# Patient Record
Sex: MALE | Race: WHITE | Employment: OTHER | URBAN - METROPOLITAN AREA
[De-identification: names, ages, dates, MRNs, and addresses within clinical notes are randomized per-mention and may not be internally consistent; named-entity substitution may affect disease eponyms.]

---

## 2018-09-02 ENCOUNTER — HOSPITAL ENCOUNTER (EMERGENCY)
Age: 63
Discharge: HOME | End: 2018-09-02
Payer: MEDICARE

## 2018-09-02 VITALS
HEART RATE: 91 BPM | RESPIRATION RATE: 18 BRPM | TEMPERATURE: 98.9 F | DIASTOLIC BLOOD PRESSURE: 97 MMHG | BODY MASS INDEX: 33.23 KG/M2 | OXYGEN SATURATION: 96 % | SYSTOLIC BLOOD PRESSURE: 177 MMHG | WEIGHT: 225 LBS

## 2018-09-02 DIAGNOSIS — N39.0 URINARY TRACT INFECTION WITHOUT HEMATURIA, SITE UNSPECIFIED: Primary | ICD-10-CM

## 2018-09-02 LAB
BACTERIA: NORMAL /HPF
BILIRUBIN URINE: NEGATIVE
BLOOD, URINE: NEGATIVE
CLARITY: CLEAR
COLOR: YELLOW
GLUCOSE URINE: NEGATIVE MG/DL
KETONES, URINE: NEGATIVE MG/DL
LEUKOCYTE ESTERASE, URINE: ABNORMAL
NITRITE, URINE: NEGATIVE
PH UA: 5.5 (ref 5–9)
PROTEIN UA: NEGATIVE MG/DL
RBC UA: NORMAL /HPF (ref 0–2)
SPECIFIC GRAVITY UA: 1.02 (ref 1–1.03)
UROBILINOGEN, URINE: 0.2 E.U./DL
WBC UA: NORMAL /HPF (ref 0–5)

## 2018-09-02 PROCEDURE — 87088 URINE BACTERIA CULTURE: CPT

## 2018-09-02 PROCEDURE — 99212 OFFICE O/P EST SF 10 MIN: CPT

## 2018-09-02 PROCEDURE — 81001 URINALYSIS AUTO W/SCOPE: CPT

## 2018-09-02 RX ORDER — CEPHALEXIN 500 MG/1
500 CAPSULE ORAL 3 TIMES DAILY
Qty: 21 CAPSULE | Refills: 0 | Status: SHIPPED | OUTPATIENT
Start: 2018-09-02 | End: 2018-09-09

## 2018-09-02 ASSESSMENT — PAIN SCALES - GENERAL: PAINLEVEL_OUTOF10: 7

## 2018-09-02 ASSESSMENT — PAIN DESCRIPTION - DESCRIPTORS: DESCRIPTORS: BURNING

## 2018-09-02 ASSESSMENT — PAIN DESCRIPTION - LOCATION: LOCATION: PENIS

## 2018-09-02 NOTE — ED PROVIDER NOTES
Negative Negative    Leukocyte Esterase, Urine TRACE (A) Negative   Microscopic Urinalysis   Result Value Ref Range    WBC, UA 1-3 0 - 5 /HPF    RBC, UA 0-1 0 - 2 /HPF    Bacteria, UA NONE /HPF       RADIOLOGY:  Interpreted by Radiologist.  No orders to display       ------------------------- NURSING NOTES AND VITALS REVIEWED ---------------------------   The nursing notes within the ED encounter and vital signs as below have been reviewed. BP (!) 177/97   Pulse 91   Temp 98.9 °F (37.2 °C) (Oral)   Resp 18   Wt 225 lb (102.1 kg)   SpO2 96%   BMI 33.23 kg/m²   Oxygen Saturation Interpretation: Normal      ---------------------------------------------------PHYSICAL EXAM--------------------------------------      Constitutional/General: Alert and oriented x3, well appearing, non toxic in NAD  Head: Normocephalic and atraumatic  Eyes: Conjunctiva clear  Mouth: Oropharynx clear, handling secretions, no trismus  Neck: Supple, full ROM,   Pulmonary: Lungs clear to auscultation bilaterally, no wheezes, rales, or rhonchi. Not in respiratory distress  Cardiovascular:  Regular rate and rhythm, no murmurs, gallops, or rubs. 2+ distal pulses  Abdomen: Soft, non tender, non distended,   Extremities: Moves all extremities x 4. Warm and well perfused  Skin: warm and dry without rash  Neurologic: GCS 15,  Psych: Normal Affect      ------------------------------ ED COURSE/MEDICAL DECISION MAKING----------------------  Medications - No data to display      ED COURSE:       Medical Decision Making:    Patient complaining of some burning with urination for 3 days. He does have some leukocyte esterase in his urine will send that for culture. I will put him on some Keflex and see if that takes care of his symptoms it doesn't resolve he needs to follow-up with his doctor or if he worsens.   He said there is no chance it's an STD since he's been  for 30 years and he's not suspecting that all denies any drainage or

## 2018-09-05 LAB — URINE CULTURE, ROUTINE: NORMAL

## 2018-10-05 ENCOUNTER — HOSPITAL ENCOUNTER (EMERGENCY)
Age: 63
Discharge: HOME | End: 2018-10-05
Payer: MEDICARE

## 2018-10-05 VITALS
DIASTOLIC BLOOD PRESSURE: 95 MMHG | SYSTOLIC BLOOD PRESSURE: 160 MMHG | WEIGHT: 225 LBS | RESPIRATION RATE: 18 BRPM | TEMPERATURE: 98.2 F | OXYGEN SATURATION: 97 % | HEART RATE: 87 BPM | BODY MASS INDEX: 33.23 KG/M2

## 2018-10-05 DIAGNOSIS — S01.81XA FACIAL LACERATION, INITIAL ENCOUNTER: ICD-10-CM

## 2018-10-05 DIAGNOSIS — R30.0 DYSURIA: Primary | ICD-10-CM

## 2018-10-05 LAB
BILIRUBIN URINE: NEGATIVE
BLOOD, URINE: NEGATIVE
CLARITY: CLEAR
COLOR: YELLOW
GLUCOSE URINE: NEGATIVE MG/DL
KETONES, URINE: NEGATIVE MG/DL
LEUKOCYTE ESTERASE, URINE: NEGATIVE
NITRITE, URINE: NEGATIVE
PH UA: 5.5 (ref 5–9)
PROTEIN UA: NEGATIVE MG/DL
SPECIFIC GRAVITY UA: <=1.005 (ref 1–1.03)
UROBILINOGEN, URINE: 0.2 E.U./DL

## 2018-10-05 PROCEDURE — 87591 N.GONORRHOEAE DNA AMP PROB: CPT

## 2018-10-05 PROCEDURE — 99212 OFFICE O/P EST SF 10 MIN: CPT

## 2018-10-05 PROCEDURE — 87088 URINE BACTERIA CULTURE: CPT

## 2018-10-05 PROCEDURE — 87491 CHLMYD TRACH DNA AMP PROBE: CPT

## 2018-10-05 PROCEDURE — 81003 URINALYSIS AUTO W/O SCOPE: CPT

## 2018-10-05 RX ORDER — NITROFURANTOIN 25; 75 MG/1; MG/1
100 CAPSULE ORAL 2 TIMES DAILY
Qty: 14 CAPSULE | Refills: 0 | Status: SHIPPED | OUTPATIENT
Start: 2018-10-05 | End: 2018-10-12

## 2018-10-05 ASSESSMENT — PAIN DESCRIPTION - ORIENTATION: ORIENTATION: LEFT

## 2018-10-05 ASSESSMENT — PAIN SCALES - GENERAL: PAINLEVEL_OUTOF10: 3

## 2018-10-05 ASSESSMENT — PAIN DESCRIPTION - PAIN TYPE: TYPE: ACUTE PAIN

## 2018-10-05 ASSESSMENT — PAIN DESCRIPTION - LOCATION: LOCATION: EYE

## 2018-10-05 ASSESSMENT — PAIN DESCRIPTION - DESCRIPTORS: DESCRIPTORS: BURNING;THROBBING

## 2018-10-08 LAB
CHLAMYDIA TRACHOMATIS AMPLIFIED DET: NORMAL
N GONORRHOEAE AMPLIFIED DET: NORMAL
URINE CULTURE, ROUTINE: NORMAL

## 2019-02-28 ENCOUNTER — OFFICE VISIT (OUTPATIENT)
Dept: SURGERY | Age: 64
End: 2019-02-28
Payer: MEDICARE

## 2019-02-28 VITALS
OXYGEN SATURATION: 98 % | BODY MASS INDEX: 33.33 KG/M2 | WEIGHT: 225 LBS | HEART RATE: 81 BPM | SYSTOLIC BLOOD PRESSURE: 136 MMHG | DIASTOLIC BLOOD PRESSURE: 84 MMHG | HEIGHT: 69 IN

## 2019-02-28 DIAGNOSIS — L90.5 SCAR OF FACE: Primary | ICD-10-CM

## 2019-02-28 PROCEDURE — G8484 FLU IMMUNIZE NO ADMIN: HCPCS | Performed by: PHYSICIAN ASSISTANT

## 2019-02-28 PROCEDURE — G8427 DOCREV CUR MEDS BY ELIG CLIN: HCPCS | Performed by: PHYSICIAN ASSISTANT

## 2019-02-28 PROCEDURE — G8417 CALC BMI ABV UP PARAM F/U: HCPCS | Performed by: PHYSICIAN ASSISTANT

## 2019-02-28 PROCEDURE — 1101F PT FALLS ASSESS-DOCD LE1/YR: CPT | Performed by: PHYSICIAN ASSISTANT

## 2019-02-28 PROCEDURE — 1036F TOBACCO NON-USER: CPT | Performed by: PHYSICIAN ASSISTANT

## 2019-02-28 PROCEDURE — 1123F ACP DISCUSS/DSCN MKR DOCD: CPT | Performed by: PHYSICIAN ASSISTANT

## 2019-02-28 PROCEDURE — 4040F PNEUMOC VAC/ADMIN/RCVD: CPT | Performed by: PHYSICIAN ASSISTANT

## 2019-02-28 PROCEDURE — 99203 OFFICE O/P NEW LOW 30 MIN: CPT | Performed by: PHYSICIAN ASSISTANT

## 2019-02-28 PROCEDURE — 3017F COLORECTAL CA SCREEN DOC REV: CPT | Performed by: PHYSICIAN ASSISTANT

## 2019-03-08 ENCOUNTER — ANESTHESIA EVENT (OUTPATIENT)
Dept: OPERATING ROOM | Age: 64
End: 2019-03-08
Payer: MEDICARE

## 2019-03-08 RX ORDER — LOSARTAN POTASSIUM 50 MG/1
50 TABLET ORAL NIGHTLY
COMMUNITY

## 2019-03-08 SDOH — HEALTH STABILITY: MENTAL HEALTH: HOW OFTEN DO YOU HAVE A DRINK CONTAINING ALCOHOL?: NEVER

## 2019-03-15 ENCOUNTER — HOSPITAL ENCOUNTER (OUTPATIENT)
Age: 64
Setting detail: OUTPATIENT SURGERY
Discharge: HOME | End: 2019-03-15
Attending: PODIATRIST | Admitting: PODIATRIST
Payer: MEDICARE

## 2019-03-15 ENCOUNTER — HOSPITAL ENCOUNTER (OUTPATIENT)
Dept: OPERATING ROOM | Age: 64
Discharge: HOME | End: 2019-03-15
Payer: MEDICARE

## 2019-03-15 ENCOUNTER — ANESTHESIA (OUTPATIENT)
Dept: OPERATING ROOM | Age: 64
End: 2019-03-15
Payer: MEDICARE

## 2019-03-15 VITALS — SYSTOLIC BLOOD PRESSURE: 114 MMHG | DIASTOLIC BLOOD PRESSURE: 69 MMHG | OXYGEN SATURATION: 98 %

## 2019-03-15 VITALS
DIASTOLIC BLOOD PRESSURE: 97 MMHG | HEIGHT: 69 IN | WEIGHT: 225 LBS | TEMPERATURE: 98 F | BODY MASS INDEX: 33.33 KG/M2 | SYSTOLIC BLOOD PRESSURE: 153 MMHG | HEART RATE: 66 BPM | RESPIRATION RATE: 16 BRPM | OXYGEN SATURATION: 95 %

## 2019-03-15 DIAGNOSIS — M20.21 HALLUX RIGIDUS OF BOTH FEET: ICD-10-CM

## 2019-03-15 DIAGNOSIS — M20.22 HALLUX RIGIDUS OF BOTH FEET: ICD-10-CM

## 2019-03-15 DIAGNOSIS — R52 PAIN: ICD-10-CM

## 2019-03-15 DIAGNOSIS — M20.42 ACQUIRED HAMMER TOE OF GREAT TOES OF BOTH FEET: Primary | ICD-10-CM

## 2019-03-15 DIAGNOSIS — M20.41 ACQUIRED HAMMER TOE OF GREAT TOES OF BOTH FEET: Primary | ICD-10-CM

## 2019-03-15 PROCEDURE — 3600000004 HC SURGERY LEVEL 4 BASE: Performed by: PODIATRIST

## 2019-03-15 PROCEDURE — 6370000000 HC RX 637 (ALT 250 FOR IP): Performed by: ANESTHESIOLOGY

## 2019-03-15 PROCEDURE — 3600000014 HC SURGERY LEVEL 4 ADDTL 15MIN: Performed by: PODIATRIST

## 2019-03-15 PROCEDURE — 3700000001 HC ADD 15 MINUTES (ANESTHESIA): Performed by: PODIATRIST

## 2019-03-15 PROCEDURE — 2709999900 HC NON-CHARGEABLE SUPPLY: Performed by: PODIATRIST

## 2019-03-15 PROCEDURE — 3209999900 FLUORO FOR SURGICAL PROCEDURES

## 2019-03-15 PROCEDURE — 3700000000 HC ANESTHESIA ATTENDED CARE: Performed by: PODIATRIST

## 2019-03-15 PROCEDURE — 2580000003 HC RX 258: Performed by: ANESTHESIOLOGY

## 2019-03-15 PROCEDURE — 7100000010 HC PHASE II RECOVERY - FIRST 15 MIN: Performed by: PODIATRIST

## 2019-03-15 PROCEDURE — 7100000011 HC PHASE II RECOVERY - ADDTL 15 MIN: Performed by: PODIATRIST

## 2019-03-15 PROCEDURE — 6360000002 HC RX W HCPCS: Performed by: NURSE ANESTHETIST, CERTIFIED REGISTERED

## 2019-03-15 PROCEDURE — 2500000003 HC RX 250 WO HCPCS: Performed by: PODIATRIST

## 2019-03-15 PROCEDURE — 2580000003 HC RX 258: Performed by: PODIATRIST

## 2019-03-15 PROCEDURE — C1713 ANCHOR/SCREW BN/BN,TIS/BN: HCPCS | Performed by: PODIATRIST

## 2019-03-15 PROCEDURE — C9359 IMPLNT,BON VOID FILLER-PUTTY: HCPCS | Performed by: PODIATRIST

## 2019-03-15 PROCEDURE — 6360000002 HC RX W HCPCS: Performed by: PODIATRIST

## 2019-03-15 PROCEDURE — 2720000010 HC SURG SUPPLY STERILE: Performed by: PODIATRIST

## 2019-03-15 DEVICE — IMPLANTABLE DEVICE
Type: IMPLANTABLE DEVICE | Site: FOOT | Status: FUNCTIONAL
Brand: ORTHOLOC 3DI

## 2019-03-15 DEVICE — IMPLANTABLE DEVICE
Type: IMPLANTABLE DEVICE | Site: FOOT | Status: FUNCTIONAL
Brand: ORTHOLOC

## 2019-03-15 DEVICE — MUC SCREW
Type: IMPLANTABLE DEVICE | Site: FOOT | Status: FUNCTIONAL
Brand: CHARLOTTE

## 2019-03-15 DEVICE — INJECTABLE PUTTY
Type: IMPLANTABLE DEVICE | Site: FOOT | Status: FUNCTIONAL
Brand: ALLOMATRIX

## 2019-03-15 RX ORDER — ACETAMINOPHEN 325 MG/1
650 TABLET ORAL EVERY 4 HOURS PRN
Status: DISCONTINUED | OUTPATIENT
Start: 2019-03-15 | End: 2019-03-15 | Stop reason: HOSPADM

## 2019-03-15 RX ORDER — HYDRALAZINE HYDROCHLORIDE 20 MG/ML
5 INJECTION INTRAMUSCULAR; INTRAVENOUS EVERY 10 MIN PRN
Status: DISCONTINUED | OUTPATIENT
Start: 2019-03-15 | End: 2019-03-15 | Stop reason: HOSPADM

## 2019-03-15 RX ORDER — SODIUM CHLORIDE, SODIUM LACTATE, POTASSIUM CHLORIDE, CALCIUM CHLORIDE 600; 310; 30; 20 MG/100ML; MG/100ML; MG/100ML; MG/100ML
INJECTION, SOLUTION INTRAVENOUS CONTINUOUS
Status: DISCONTINUED | OUTPATIENT
Start: 2019-03-15 | End: 2019-03-15 | Stop reason: HOSPADM

## 2019-03-15 RX ORDER — HYDROMORPHONE HYDROCHLORIDE 1 MG/ML
0.25 INJECTION, SOLUTION INTRAMUSCULAR; INTRAVENOUS; SUBCUTANEOUS EVERY 5 MIN PRN
Status: DISCONTINUED | OUTPATIENT
Start: 2019-03-15 | End: 2019-03-15 | Stop reason: HOSPADM

## 2019-03-15 RX ORDER — MIDAZOLAM HYDROCHLORIDE 1 MG/ML
INJECTION INTRAMUSCULAR; INTRAVENOUS PRN
Status: DISCONTINUED | OUTPATIENT
Start: 2019-03-15 | End: 2019-03-15 | Stop reason: SDUPTHER

## 2019-03-15 RX ORDER — MORPHINE SULFATE 2 MG/ML
1 INJECTION, SOLUTION INTRAMUSCULAR; INTRAVENOUS EVERY 5 MIN PRN
Status: DISCONTINUED | OUTPATIENT
Start: 2019-03-15 | End: 2019-03-15 | Stop reason: HOSPADM

## 2019-03-15 RX ORDER — PROMETHAZINE HYDROCHLORIDE 25 MG/ML
25 INJECTION, SOLUTION INTRAMUSCULAR; INTRAVENOUS
Status: DISCONTINUED | OUTPATIENT
Start: 2019-03-15 | End: 2019-03-15 | Stop reason: HOSPADM

## 2019-03-15 RX ORDER — DIPHENHYDRAMINE HYDROCHLORIDE 50 MG/ML
12.5 INJECTION INTRAMUSCULAR; INTRAVENOUS
Status: DISCONTINUED | OUTPATIENT
Start: 2019-03-15 | End: 2019-03-15 | Stop reason: HOSPADM

## 2019-03-15 RX ORDER — FENTANYL CITRATE 50 UG/ML
50 INJECTION, SOLUTION INTRAMUSCULAR; INTRAVENOUS EVERY 5 MIN PRN
Status: DISCONTINUED | OUTPATIENT
Start: 2019-03-15 | End: 2019-03-15 | Stop reason: HOSPADM

## 2019-03-15 RX ORDER — FENTANYL CITRATE 50 UG/ML
INJECTION, SOLUTION INTRAMUSCULAR; INTRAVENOUS PRN
Status: DISCONTINUED | OUTPATIENT
Start: 2019-03-15 | End: 2019-03-15 | Stop reason: SDUPTHER

## 2019-03-15 RX ORDER — MEPERIDINE HYDROCHLORIDE 50 MG/ML
12.5 INJECTION INTRAMUSCULAR; INTRAVENOUS; SUBCUTANEOUS EVERY 5 MIN PRN
Status: DISCONTINUED | OUTPATIENT
Start: 2019-03-15 | End: 2019-03-15 | Stop reason: HOSPADM

## 2019-03-15 RX ORDER — HYDROCODONE BITARTRATE AND ACETAMINOPHEN 5; 325 MG/1; MG/1
2 TABLET ORAL PRN
Status: DISCONTINUED | OUTPATIENT
Start: 2019-03-15 | End: 2019-03-15 | Stop reason: HOSPADM

## 2019-03-15 RX ORDER — HYDROCODONE BITARTRATE AND ACETAMINOPHEN 5; 325 MG/1; MG/1
1 TABLET ORAL PRN
Status: DISCONTINUED | OUTPATIENT
Start: 2019-03-15 | End: 2019-03-15 | Stop reason: HOSPADM

## 2019-03-15 RX ORDER — TRAMADOL HYDROCHLORIDE 50 MG/1
50 TABLET ORAL EVERY 4 HOURS PRN
Qty: 42 TABLET | Refills: 0 | Status: SHIPPED | OUTPATIENT
Start: 2019-03-15 | End: 2019-03-22

## 2019-03-15 RX ORDER — PROPOFOL 10 MG/ML
INJECTION, EMULSION INTRAVENOUS CONTINUOUS PRN
Status: DISCONTINUED | OUTPATIENT
Start: 2019-03-15 | End: 2019-03-15 | Stop reason: SDUPTHER

## 2019-03-15 RX ORDER — LABETALOL HYDROCHLORIDE 5 MG/ML
5 INJECTION, SOLUTION INTRAVENOUS EVERY 10 MIN PRN
Status: DISCONTINUED | OUTPATIENT
Start: 2019-03-15 | End: 2019-03-15 | Stop reason: HOSPADM

## 2019-03-15 RX ADMIN — ACETAMINOPHEN 650 MG: 325 TABLET ORAL at 14:20

## 2019-03-15 RX ADMIN — SODIUM CHLORIDE, POTASSIUM CHLORIDE, SODIUM LACTATE AND CALCIUM CHLORIDE: 600; 310; 30; 20 INJECTION, SOLUTION INTRAVENOUS at 11:06

## 2019-03-15 RX ADMIN — FENTANYL CITRATE 100 MCG: 50 INJECTION, SOLUTION INTRAMUSCULAR; INTRAVENOUS at 12:08

## 2019-03-15 RX ADMIN — MIDAZOLAM 2 MG: 1 INJECTION INTRAMUSCULAR; INTRAVENOUS at 12:03

## 2019-03-15 RX ADMIN — PROPOFOL 100 MCG/KG/MIN: 10 INJECTION, EMULSION INTRAVENOUS at 12:08

## 2019-03-15 RX ADMIN — CEFAZOLIN 2 G: 1 INJECTION, POWDER, FOR SOLUTION INTRAMUSCULAR; INTRAVENOUS at 12:02

## 2019-03-15 ASSESSMENT — PULMONARY FUNCTION TESTS
PIF_VALUE: 0

## 2019-03-15 ASSESSMENT — PAIN DESCRIPTION - LOCATION
LOCATION: FOOT

## 2019-03-15 ASSESSMENT — PAIN DESCRIPTION - FREQUENCY
FREQUENCY: CONTINUOUS

## 2019-03-15 ASSESSMENT — PAIN DESCRIPTION - ORIENTATION
ORIENTATION: RIGHT

## 2019-03-15 ASSESSMENT — PAIN DESCRIPTION - DESCRIPTORS
DESCRIPTORS: ACHING;DISCOMFORT
DESCRIPTORS: ACHING
DESCRIPTORS: ACHING

## 2019-03-15 ASSESSMENT — PAIN SCALES - GENERAL
PAINLEVEL_OUTOF10: 9
PAINLEVEL_OUTOF10: 9
PAINLEVEL_OUTOF10: 7

## 2019-03-15 ASSESSMENT — PAIN DESCRIPTION - PAIN TYPE
TYPE: CHRONIC PAIN
TYPE: SURGICAL PAIN
TYPE: SURGICAL PAIN

## 2019-03-15 ASSESSMENT — PAIN - FUNCTIONAL ASSESSMENT: PAIN_FUNCTIONAL_ASSESSMENT: 0-10

## 2019-11-06 ENCOUNTER — OFFICE VISIT (OUTPATIENT)
Dept: SURGERY | Age: 64
End: 2019-11-06
Payer: MEDICARE

## 2019-11-06 VITALS
DIASTOLIC BLOOD PRESSURE: 88 MMHG | HEART RATE: 74 BPM | TEMPERATURE: 96.2 F | SYSTOLIC BLOOD PRESSURE: 142 MMHG | OXYGEN SATURATION: 98 % | RESPIRATION RATE: 20 BRPM

## 2019-11-06 DIAGNOSIS — L90.5 SCAR OF FACE: Primary | ICD-10-CM

## 2019-11-06 PROCEDURE — 99213 OFFICE O/P EST LOW 20 MIN: CPT | Performed by: PLASTIC SURGERY

## 2019-11-06 PROCEDURE — 1036F TOBACCO NON-USER: CPT | Performed by: PLASTIC SURGERY

## 2019-11-06 PROCEDURE — 1123F ACP DISCUSS/DSCN MKR DOCD: CPT | Performed by: PLASTIC SURGERY

## 2019-11-06 PROCEDURE — G8484 FLU IMMUNIZE NO ADMIN: HCPCS | Performed by: PLASTIC SURGERY

## 2019-11-06 PROCEDURE — 4040F PNEUMOC VAC/ADMIN/RCVD: CPT | Performed by: PLASTIC SURGERY

## 2019-11-06 PROCEDURE — 3017F COLORECTAL CA SCREEN DOC REV: CPT | Performed by: PLASTIC SURGERY

## 2019-11-06 PROCEDURE — G8427 DOCREV CUR MEDS BY ELIG CLIN: HCPCS | Performed by: PLASTIC SURGERY

## 2019-11-06 PROCEDURE — G8417 CALC BMI ABV UP PARAM F/U: HCPCS | Performed by: PLASTIC SURGERY

## 2019-11-07 ENCOUNTER — TELEPHONE (OUTPATIENT)
Dept: SURGERY | Age: 64
End: 2019-11-07

## 2019-12-11 ENCOUNTER — PROCEDURE VISIT (OUTPATIENT)
Dept: SURGERY | Age: 64
End: 2019-12-11
Payer: MEDICARE

## 2019-12-11 DIAGNOSIS — L90.5 SCAR OF FACE: Primary | ICD-10-CM

## 2019-12-11 PROCEDURE — 17110 DESTRUCTION B9 LES UP TO 14: CPT | Performed by: PLASTIC SURGERY

## 2019-12-11 RX ORDER — LIDOCAINE HYDROCHLORIDE AND EPINEPHRINE 10; 10 MG/ML; UG/ML
3 INJECTION, SOLUTION INFILTRATION; PERINEURAL ONCE
Status: COMPLETED | OUTPATIENT
Start: 2019-12-11 | End: 2019-12-11

## 2019-12-11 RX ORDER — ACYCLOVIR 400 MG/1
TABLET ORAL
Qty: 25 TABLET | Refills: 0 | Status: SHIPPED | OUTPATIENT
Start: 2019-12-11

## 2019-12-11 RX ADMIN — LIDOCAINE HYDROCHLORIDE AND EPINEPHRINE 3 ML: 10; 10 INJECTION, SOLUTION INFILTRATION; PERINEURAL at 16:21

## 2019-12-16 ENCOUNTER — TELEPHONE (OUTPATIENT)
Dept: SURGERY | Age: 64
End: 2019-12-16

## 2019-12-18 ENCOUNTER — OFFICE VISIT (OUTPATIENT)
Dept: SURGERY | Age: 64
End: 2019-12-18
Payer: MEDICARE

## 2019-12-18 VITALS
RESPIRATION RATE: 24 BRPM | DIASTOLIC BLOOD PRESSURE: 90 MMHG | OXYGEN SATURATION: 97 % | SYSTOLIC BLOOD PRESSURE: 130 MMHG | HEART RATE: 90 BPM | TEMPERATURE: 97.7 F

## 2019-12-18 DIAGNOSIS — L90.5 SCAR OF FACE: Primary | ICD-10-CM

## 2019-12-18 PROCEDURE — G8417 CALC BMI ABV UP PARAM F/U: HCPCS | Performed by: PHYSICIAN ASSISTANT

## 2019-12-18 PROCEDURE — G8427 DOCREV CUR MEDS BY ELIG CLIN: HCPCS | Performed by: PHYSICIAN ASSISTANT

## 2019-12-18 PROCEDURE — G8484 FLU IMMUNIZE NO ADMIN: HCPCS | Performed by: PHYSICIAN ASSISTANT

## 2019-12-18 PROCEDURE — 3017F COLORECTAL CA SCREEN DOC REV: CPT | Performed by: PHYSICIAN ASSISTANT

## 2019-12-18 PROCEDURE — 4040F PNEUMOC VAC/ADMIN/RCVD: CPT | Performed by: PHYSICIAN ASSISTANT

## 2019-12-18 PROCEDURE — 1123F ACP DISCUSS/DSCN MKR DOCD: CPT | Performed by: PHYSICIAN ASSISTANT

## 2019-12-18 PROCEDURE — 99212 OFFICE O/P EST SF 10 MIN: CPT | Performed by: PHYSICIAN ASSISTANT

## 2019-12-18 PROCEDURE — 1036F TOBACCO NON-USER: CPT | Performed by: PHYSICIAN ASSISTANT

## 2020-01-06 ENCOUNTER — TELEPHONE (OUTPATIENT)
Dept: SURGERY | Age: 65
End: 2020-01-06

## 2020-01-06 NOTE — TELEPHONE ENCOUNTER
This patient has been scheduled for their in-office procedure on 01/29/2020. If taking Fish Oil, Vitamins, two weeks prior to procedure stop taking. If taking NSAIDS (such as Aspirin, Ibuprofen) anticoagulants please consult with your prescribing physician to get further instructions on when to stop medication prior to surgery that is scheduled, patient understood.     No prior auth, traditional Medicare

## 2020-01-18 ENCOUNTER — HOSPITAL ENCOUNTER (EMERGENCY)
Age: 65
Discharge: HOME | End: 2020-01-18
Attending: NURSE PRACTITIONER
Payer: MEDICARE

## 2020-01-18 VITALS
BODY MASS INDEX: 31.84 KG/M2 | DIASTOLIC BLOOD PRESSURE: 84 MMHG | TEMPERATURE: 98.8 F | HEIGHT: 69 IN | RESPIRATION RATE: 16 BRPM | SYSTOLIC BLOOD PRESSURE: 140 MMHG | OXYGEN SATURATION: 95 % | HEART RATE: 96 BPM | WEIGHT: 215 LBS

## 2020-01-18 PROCEDURE — 99212 OFFICE O/P EST SF 10 MIN: CPT

## 2020-01-18 ASSESSMENT — PAIN DESCRIPTION - ORIENTATION
ORIENTATION: UPPER
ORIENTATION: LEFT

## 2020-01-18 ASSESSMENT — PAIN DESCRIPTION - FREQUENCY: FREQUENCY: CONTINUOUS

## 2020-01-18 ASSESSMENT — PAIN DESCRIPTION - DESCRIPTORS
DESCRIPTORS: BURNING;SHARP
DESCRIPTORS: BURNING;SHARP

## 2020-01-18 ASSESSMENT — PAIN DESCRIPTION - PROGRESSION: CLINICAL_PROGRESSION: GRADUALLY WORSENING

## 2020-01-18 ASSESSMENT — PAIN DESCRIPTION - PAIN TYPE
TYPE: ACUTE PAIN
TYPE: ACUTE PAIN

## 2020-01-18 ASSESSMENT — PAIN SCALES - GENERAL: PAINLEVEL_OUTOF10: 7

## 2020-01-18 ASSESSMENT — PAIN DESCRIPTION - LOCATION
LOCATION: ARM
LOCATION: ARM

## 2020-01-18 ASSESSMENT — PAIN DESCRIPTION - ONSET: ONSET: ON-GOING

## 2020-01-18 ASSESSMENT — PAIN - FUNCTIONAL ASSESSMENT: PAIN_FUNCTIONAL_ASSESSMENT: 0-10

## 2020-01-18 NOTE — ED PROVIDER NOTES
Temp Source Pulse Resp SpO2 Height Weight   (!) 140/84 98.8 °F (37.1 °C) Oral 96 16 95 % 5' 9\" (1.753 m) 215 lb (97.5 kg)      Oxygen Saturation Interpretation: Normal.    General Appearance/Constitutional:  Alert, development consistent with age. HEENT:  NC/NT. PERRLA. Airway patent. Neck:  Normal ROM. Supple. Respiratory:  Clear to auscultation and breath sounds equal.  CV:  Regular rate and rhythm, normal heart sounds, without pathological murmurs, ectopy, gallops, or rubs. Extremities: No tenderness or edema noted. Integument:  Normal turgor. Erythematous rash left upper arm  Lymphatics: No lymphangitis or adenopathy noted. Neurological:  Oriented. Motor functions intact. Lab / Imaging Results     (All laboratory and radiology results have been personally reviewed by myself)  Labs:  No results found for this visit on 01/18/20. Imaging: All Radiology results interpreted by Radiologist unless otherwise noted. No orders to display       ED Course / Medical Decision Making   Medications - No data to display       Consult(s):   None    Procedure(s):   none    MDM:   Patient is well-appearing, nontoxic, in NAD. He reports a rash onset after pneumonia vaccine yesterday. He denies shortness of breath, difficulty breathing, or difficulty swallowing. Rash appears self-limiting immune response and he can use a cool compress for symptom relief. He will be discharged home in stable condition. He should follow-up with his primary care and proceed to the ED if symptoms worsen. Counseling: The emergency provider has spoken with the patient and discussed todays results, in addition to providing specific details for the plan of care and counseling regarding the diagnosis and prognosis. Questions are answered at this time and they are agreeable with the plan. Assessment      1.  Injection site reaction, initial encounter      Plan   Discharge to home  Patient condition is stable    New

## 2020-01-23 ENCOUNTER — TELEPHONE (OUTPATIENT)
Dept: SURGERY | Age: 65
End: 2020-01-23

## 2020-01-24 RX ORDER — ACYCLOVIR 400 MG/1
400 TABLET ORAL
Qty: 25 TABLET | Refills: 0 | Status: SHIPPED | OUTPATIENT
Start: 2020-01-24

## 2020-05-29 ENCOUNTER — TELEPHONE (OUTPATIENT)
Dept: SURGERY | Age: 65
End: 2020-05-29

## 2020-06-10 ENCOUNTER — OFFICE VISIT (OUTPATIENT)
Dept: SURGERY | Age: 65
End: 2020-06-10

## 2020-06-10 VITALS
BODY MASS INDEX: 33.33 KG/M2 | SYSTOLIC BLOOD PRESSURE: 140 MMHG | RESPIRATION RATE: 20 BRPM | WEIGHT: 225 LBS | TEMPERATURE: 99.4 F | OXYGEN SATURATION: 97 % | DIASTOLIC BLOOD PRESSURE: 88 MMHG | HEIGHT: 69 IN | HEART RATE: 88 BPM

## 2020-06-10 PROCEDURE — MISCLF1: Performed by: PLASTIC SURGERY

## 2020-06-10 RX ORDER — LIDOCAINE HYDROCHLORIDE AND EPINEPHRINE 10; 10 MG/ML; UG/ML
3 INJECTION, SOLUTION INFILTRATION; PERINEURAL ONCE
Status: COMPLETED | OUTPATIENT
Start: 2020-06-10 | End: 2020-06-10

## 2020-06-10 RX ADMIN — LIDOCAINE HYDROCHLORIDE AND EPINEPHRINE 3 ML: 10; 10 INJECTION, SOLUTION INFILTRATION; PERINEURAL at 16:42

## 2020-07-02 ENCOUNTER — OFFICE VISIT (OUTPATIENT)
Dept: SURGERY | Age: 65
End: 2020-07-02

## 2020-07-02 VITALS — TEMPERATURE: 98.3 F

## 2020-07-02 PROCEDURE — MISCPNC PLASTICS VISIT NO CHARGE: Performed by: PHYSICIAN ASSISTANT

## 2020-07-02 NOTE — PROGRESS NOTES
Subjective: Follow up today from Combination MLP / ProFractional laser treatment today to symptomatic traumatic scar of forehead 8 x 6 cm . Denies fever, nausea, vomiting. Pain is 0 of 10. Patient notes no drainage from the face. No herpetic lesions present. Patient was compliant with cleansing and Aquaphor and voiced satisfaction with result to date. Objective: There were no vitals taken for this visit. Wound: Clean, re-epithelialized. Appropriate erythema. No herpetic lesions, no evidence for infection. Improved complexion. Appropriate minimal swelling . There is continued hypopigmented scarring throughout the 8 x 6 cm surface area with gray to purple neovascularization. Assessment:    Patient Active Problem List   Diagnosis    Acquired hammer toe of great toes of both feet    Hallux rigidus of both feet       Plan:     Follow up from Combination MLP / ProFractional laser treatment      Continue gentle cleansing and normal skin care routine. Educated on the use of sunscreen of SPF 27 or greater and sun protection. F/U in 3 months for BBL to forehead    A cosmetic quote will be mailed to the patient. Patient voices understanding we will plan for the procedure that date however an assessment will be made the day of his procedure to see if broadband light would benefit him most appropriately at that time. We will wait another 3 months as his scar from the [facility] pro-fractional laser will need to continue to settle and resolve. Patient voices understanding and appreciation    I explained to the patient today that his hypopigmented scars would not be resolved from broadband light therapy however some of the gray to purplish hue surrounding the hypopigmented scar may relax with broadband light patient voices understanding. No guarantees were made    Call office with concerns or signs of infection.     Chiqui Licona   8:47 AM  7/2/2020

## 2020-12-18 NOTE — PROGRESS NOTES
Subjective: Follow up today from Combination broadband light and ProFractional laser treatment today to symptomatic traumatic scar of forehead 8 x 6 cm . Denies fever, nausea, vomiting. Pain is 0 of 10. Patient notes no drainage from the face. No herpetic lesions present. Patient was compliant with cleansing and Aquaphor and voiced satisfaction with result to date. Objective: There were no vitals taken for this visit. Wound: Clean, re-epithelialized. Appropriate erythema. No herpetic lesions, no evidence for infection. Improved complexion. Appropriate minimal swelling . There is continued hypopigmented scarring throughout the 8 x 6 cm surface area with gray to purple neovascularization. 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 Vipul Stanton. Laser safety protocol were followed. The procedure was carried out and the patient tolerated this well. Please see scanned procedure note for laser settings. Patient was sent home with moisturizer placed over the treatment area. There was no skin breakdown at the end of the procedure. Risks of laser therapy were explained including bleeding, scarring, hyopigmentation, hyperpigmentation that can be worsened by sun exposure. The patient is educated to utilize sun protection for 3-4 months after the procedure, understands that there will be some pain involved during the procedure. Patient received  ProFractional laser treatment today for scar of forehead    Laser safety protocols were followed. BLT anesthetic combination was applied to the area and allowed to work for 45 minutes. The procedure was then carried out and the patient tolerated this well. The patient was sent home with Aquaphor placed over the area treated. Risks of laser therapy were explained including bleeding, scarring, hyopigmentation, hyperpigmentation that can be worsened by sun exposure. There is a risk of herpes or bacterial infection. The patient is educated to utilize sun protection for 3-4 months after the procedure, understands that there will be some pain involved during the procedure. The patient tolerated the procedure well. Instructions reviewed. All questions were answered. Follow Up 3-7 days      This document is generated, in part, by voice recognition software and thus  syntax and grammatical errors are possible.     Lana Lizarraga  1:26 PM  12/23/2020

## 2020-12-23 ENCOUNTER — OFFICE VISIT (OUTPATIENT)
Dept: SURGERY | Age: 65
End: 2020-12-23

## 2020-12-23 VITALS — TEMPERATURE: 97.2 F

## 2020-12-23 PROCEDURE — MISCLSR1 PR OFFICE/OUTPT VISIT,PROCEDURE ONLY: Performed by: PLASTIC SURGERY

## 2020-12-23 PROCEDURE — MISCIPL4: Performed by: PLASTIC SURGERY

## 2020-12-23 RX ORDER — LIDOCAINE HYDROCHLORIDE AND EPINEPHRINE 10; 10 MG/ML; UG/ML
3 INJECTION, SOLUTION INFILTRATION; PERINEURAL ONCE
Status: COMPLETED | OUTPATIENT
Start: 2020-12-23 | End: 2020-12-23

## 2020-12-23 RX ADMIN — LIDOCAINE HYDROCHLORIDE AND EPINEPHRINE 3 ML: 10; 10 INJECTION, SOLUTION INFILTRATION; PERINEURAL at 13:05

## 2021-01-15 ENCOUNTER — OFFICE VISIT (OUTPATIENT)
Dept: SURGERY | Age: 66
End: 2021-01-15

## 2021-01-15 DIAGNOSIS — L90.5 SCAR OF FACE: Primary | ICD-10-CM

## 2021-01-15 PROCEDURE — MISCPNC PLASTICS VISIT NO CHARGE: Performed by: PHYSICIAN ASSISTANT

## 2021-01-15 NOTE — PROGRESS NOTES
Subjective: Follow up today from Combination broadband light and ProFractional laser treatment today to symptomatic traumatic scar of forehead 8 x 6 cm . Denies fever, nausea, vomiting. Pain is 0 of 10. Patient notes no drainage from the face. No herpetic lesions present. Patient was compliant with cleansing and Aquaphor and voiced satisfaction with result to date. Objective: There were no vitals taken for this visit. Wound: Clean, re-epithelialized. Appropriate erythema. No herpetic lesions, no evidence for infection. Improved complexion. Appropriate minimal swelling . There is continued hypopigmented scarring throughout the 8 x 6 cm surface area with gray to purple neovascularization. 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 Assessment:    Patient Active Problem List   Diagnosis    Acquired hammer toe of great toes of both feet    Hallux rigidus of both feet       Plan:     Follow up from Combination BBL / ProFractional laser treatment    I explained to the patient today that following his combination of BV on profractional laser to his forehead he may no longer see it a further result of contour optimization but would benefit mostly from broadband light therapy continuation at this time. Patient voices understanding appreciation    I informed him that we can plan for continued broadband light therapy in 6 weeks. Patient voices understanding    F/U in 3 months for BBL to forehead      Risks of laser therapy were explained including bleeding, scarring, hyopigmentation, hyperpigmentation that can be worsened by sun exposure. There is a risk of herpes or bacterial infection. The patient is educated to utilize sun protection for 3-4 months after the procedure, understands that there will be some pain involved during the procedure.          Follow Up 6 weeks This document is generated, in part, by voice recognition software and thus  syntax and grammatical errors are possible.     Viviana Benavides  11:21 AM  1/15/2021

## 2021-02-16 ENCOUNTER — TELEPHONE (OUTPATIENT)
Dept: SURGERY | Age: 66
End: 2021-02-16

## 2021-02-16 NOTE — TELEPHONE ENCOUNTER
Patient is now scheduled for f/u on 2/24/2021,  to determine if another procedure should be scheduled. Per patient this appointment is for bbl tx, he stated he spoke with Nathanael Schmid and this next appt is for bbl. Patient was moved from Thursday 2/25/21 to wed 2/24/21 for check. Please advise.

## 2021-02-24 ENCOUNTER — OFFICE VISIT (OUTPATIENT)
Dept: SURGERY | Age: 66
End: 2021-02-24

## 2021-02-24 VITALS — OXYGEN SATURATION: 99 % | TEMPERATURE: 98.2 F | HEART RATE: 91 BPM

## 2021-02-24 DIAGNOSIS — L90.5 SCAR OF FACE: Primary | ICD-10-CM

## 2021-02-24 PROCEDURE — MISCIPL4: Performed by: PLASTIC SURGERY

## 2021-04-21 ENCOUNTER — OFFICE VISIT (OUTPATIENT)
Dept: SURGERY | Age: 66
End: 2021-04-21

## 2021-04-21 VITALS
BODY MASS INDEX: 32.58 KG/M2 | DIASTOLIC BLOOD PRESSURE: 92 MMHG | SYSTOLIC BLOOD PRESSURE: 146 MMHG | RESPIRATION RATE: 20 BRPM | WEIGHT: 220 LBS | HEIGHT: 69 IN | HEART RATE: 84 BPM | OXYGEN SATURATION: 96 % | TEMPERATURE: 98.6 F

## 2021-04-21 DIAGNOSIS — L90.5 SCAR OF FACE: Primary | ICD-10-CM

## 2021-04-21 PROCEDURE — MISCPNC PLASTICS VISIT NO CHARGE: Performed by: PLASTIC SURGERY

## 2021-04-21 NOTE — PROGRESS NOTES
Subjective: Follow up today from erbium YAG laser ablation and broadband light therapy to forehead. Denies fever, nausea, vomiting, leg pain or swelling, pain is mild to moderate at times. The patient states he has noticed a slight decrease in the pigmentation of his scar but notes continued scar rigidity and tenderness. Objective:    BP (!) 146/92 (Site: Left Upper Arm, Position: Sitting, Cuff Size: Medium Adult)   Pulse 84   Temp 98.6 °F (37 °C) (Temporal)   Resp 20   Ht 5' 9\" (1.753 m)   Wt 220 lb (99.8 kg)   SpO2 96%   BMI 32.49 kg/m²       Scar forehead 9 cm long scar with tenderness on palpation no signs of infection surrounding telangiectasia has decreased from previous broadband light therapy. 10 used to be widened and with irregular texture despite attempts at laser resurfacing and broadband light to mitigate the color discrepancy. Assessment:    Patient Active Problem List   Diagnosis    Acquired hammer toe of great toes of both feet    Hallux rigidus of both feet       Labs: CBC: No results found for: WBC, RBC, HGB, HCT, MCV, MCH, MCHC, RDW, PLT, MPV  BMP:  No results found for: NA, K, CL, CO2, BUN, LABALBU, CREATININE, CALCIUM, GFRAA, LABGLOM, GLUCOSE      Plan:     Symptomatic scar of forehead status post previous trauma. As the patient has had multiple treatments of erbium YAG laser ablation as well as broadband light therapy with some relief of pigmentation and hypertrophy of the scar he notes continued scar formation which is tender and does cause some pain locally. Furthermore, there is a significant color discrepancy with a very translucent and thin blue-white scar on mid a very hyperemic forehead further accentuating its appearance. We will plan for excision of this symptomatic forehead scar 9 cm in office under local anesthesia.   The patient understands that excising the scar will leave him with a new scar which will need time to mature prior to assessing its final scar formation. The patient understands I cannot control his further scar formation and may be less cosmetically optimal than his scar he currently has. The patient states he is willing to trade out the risk for this. The risks, benefits and options were discussed with the pt. The risks included but not limited to pain, bleeding, infection, heavy scarring, damage to surrounding structures, fluid collections, asymmetry, and need for further procedures. All of His questions were answered to her satisfaction and He agrees to proceed with the operation. This document is generated, in part, by voice recognition software and thus  syntax and grammatical errors are possible.     Eduard Rodriguez  1:30 PM  4/21/2021

## 2021-07-02 ENCOUNTER — TELEPHONE (OUTPATIENT)
Dept: SURGERY | Age: 66
End: 2021-07-02

## 2021-07-02 NOTE — TELEPHONE ENCOUNTER
Pt wife called requesting Amoxicillin be sent to pharmacy for pt prior to his procedure on 7/7/21 due to other surgical procedures that pt has had. Pharmacy on record is correct.

## 2021-07-06 RX ORDER — AMOXICILLIN 250 MG/1
250 CAPSULE ORAL 3 TIMES DAILY
Qty: 21 CAPSULE | Refills: 0 | Status: SHIPPED | OUTPATIENT
Start: 2021-07-06 | End: 2021-07-13

## 2021-08-19 ENCOUNTER — HOSPITAL ENCOUNTER (OUTPATIENT)
Dept: CT IMAGING | Age: 66
Discharge: HOME | End: 2021-08-19
Payer: MEDICARE

## 2021-08-19 ENCOUNTER — HOSPITAL ENCOUNTER (OUTPATIENT)
Age: 66
Discharge: HOME | End: 2021-08-19
Payer: MEDICARE

## 2021-08-19 DIAGNOSIS — K43.9 VENTRAL HERNIA WITHOUT OBSTRUCTION OR GANGRENE: ICD-10-CM

## 2021-08-19 DIAGNOSIS — K46.0 OBSTRUCTION CONCURRENT WITH AND DUE TO INTERNAL HERNIA OF ABDOMEN: ICD-10-CM

## 2021-08-19 LAB
BUN BLDV-MCNC: 14 MG/DL (ref 6–23)
CREAT SERPL-MCNC: 1 MG/DL (ref 0.7–1.2)
GFR AFRICAN AMERICAN: >60
GFR NON-AFRICAN AMERICAN: >60 ML/MIN/1.73

## 2021-08-19 PROCEDURE — 84520 ASSAY OF UREA NITROGEN: CPT

## 2021-08-19 PROCEDURE — 82565 ASSAY OF CREATININE: CPT

## 2021-08-19 PROCEDURE — 6360000004 HC RX CONTRAST MEDICATION: Performed by: RADIOLOGY

## 2021-08-19 PROCEDURE — 74176 CT ABD & PELVIS W/O CONTRAST: CPT

## 2021-08-19 PROCEDURE — 36415 COLL VENOUS BLD VENIPUNCTURE: CPT

## 2021-08-19 RX ADMIN — IOHEXOL 50 ML: 240 INJECTION, SOLUTION INTRATHECAL; INTRAVASCULAR; INTRAVENOUS; ORAL at 17:31

## 2021-08-23 ENCOUNTER — TELEPHONE (OUTPATIENT)
Dept: SURGERY | Age: 66
End: 2021-08-23

## 2021-08-23 NOTE — TELEPHONE ENCOUNTER
Patient called office states he is suppose to take amoxicillin 500mg before any procedure. Sees cardiologist in [address] and stated they will not get a response until late week. Please advise. Patient has procedure scheduled with  on 8/25/2021.

## 2021-08-24 RX ORDER — AMOXICILLIN 250 MG/1
500 CAPSULE ORAL 3 TIMES DAILY
Qty: 30 CAPSULE | Refills: 0 | Status: SHIPPED | OUTPATIENT
Start: 2021-08-24 | End: 2021-08-29

## 2021-08-24 NOTE — TELEPHONE ENCOUNTER
8/24/2021 left message that script would be ordered today and to please call pharmacy before picking up.

## 2021-11-29 NOTE — TELEPHONE ENCOUNTER
Pt wife called requesting amoxicillin 500 be sent to pharmacy prior to pt procedure due to heart valve.

## 2021-11-30 RX ORDER — AMOXICILLIN 250 MG/1
500 CAPSULE ORAL 3 TIMES DAILY
Qty: 30 CAPSULE | Refills: 0 | Status: SHIPPED | OUTPATIENT
Start: 2021-11-30 | End: 2021-12-05

## 2021-12-02 ENCOUNTER — PROCEDURE VISIT (OUTPATIENT)
Dept: SURGERY | Age: 66
End: 2021-12-02
Payer: MEDICARE

## 2021-12-02 VITALS
WEIGHT: 225 LBS | BODY MASS INDEX: 33.33 KG/M2 | OXYGEN SATURATION: 95 % | TEMPERATURE: 96.3 F | SYSTOLIC BLOOD PRESSURE: 138 MMHG | HEART RATE: 78 BPM | HEIGHT: 69 IN | DIASTOLIC BLOOD PRESSURE: 84 MMHG

## 2021-12-02 DIAGNOSIS — L90.5 SCAR OF FACE: Primary | ICD-10-CM

## 2021-12-02 PROCEDURE — 12054 INTMD RPR FACE/MM 7.6-12.5CM: CPT | Performed by: PLASTIC SURGERY

## 2021-12-02 PROCEDURE — 11446 EXC FACE-MM B9+MARG >4 CM: CPT | Performed by: PLASTIC SURGERY

## 2021-12-02 RX ORDER — TAMSULOSIN HYDROCHLORIDE 0.4 MG/1
0.4 CAPSULE ORAL DAILY
COMMUNITY

## 2021-12-02 RX ORDER — MUPIROCIN CALCIUM 20 MG/G
CREAM TOPICAL
Qty: 1 EACH | Refills: 1 | Status: SHIPPED | OUTPATIENT
Start: 2021-12-02 | End: 2022-01-01

## 2021-12-02 RX ORDER — LIDOCAINE HYDROCHLORIDE AND EPINEPHRINE 10; 10 MG/ML; UG/ML
3 INJECTION, SOLUTION INFILTRATION; PERINEURAL ONCE
Status: SHIPPED | OUTPATIENT
Start: 2021-12-02

## 2021-12-03 NOTE — PROGRESS NOTES
Subjective: Follow up today for excision of. Denies fever, nausea, vomiting, leg pain or swelling. The patient voices understanding of the procedure they are having today and would like to proceed. Patient states that the mass has been painful and growing. Objective:    /84 (Site: Left Upper Arm, Position: Sitting, Cuff Size: Medium Adult)   Pulse 78   Temp 96.3 °F (35.7 °C) (Temporal)   Ht 5' 9\" (1.753 m)   Wt 225 lb (102.1 kg)   SpO2 95%   BMI 33.23 kg/m²       Symptomatic scar of forehead  Lesion-9 cm scar curvilinear from right brow to midline forehead  Margin- 1mm  Defect- 92mm x 4mm  Scar-92mm         Assessment:    Patient Active Problem List   Diagnosis    Acquired hammer toe of great toes of both feet    Hallux rigidus of both feet       Plan:       Diagnosis  -) Symptomatic scar of forehead  -) Pain    -The risks, benefits and options were discussed with the pt. The risks included but not limited to pain, bleeding, infection, heavy scarring, damage to surrounding structures, fluid collections, asymmetry, and need for further procedures. All of His questions were answered to their satisfaction and He agrees to proceed with the procedure.      -After consent was obtained, the area was cleansed with an alcohol swab. Local anesthesia consisting of 1% lidocaine with 1:100,000 epinepherine was injected  surrounding the area. The local was allowed to work. Using a 10-blade the symptomatic forehead scar was excised,  Intermediate  repair was performed. The wound(s) were closed with 5-0 Monocryl and 5-0 fast absorbing gut suture , hemostasis was obtained and a dressing was placed over the wound. The procedure was performed by Dr Jaren Galdamez    Please schedule an appointment to be seen on Follow-up with our office in 7-14 days  Diet: regular diet  Activity: no heavy lifting for 7 days. Shower/Bathing: OK to shower over the wound site in 48 hours.   No baths, hot tubs or soaking of the wound site at this time. Pt voices understanding. Wound care:   Bacitracin will need to be placed over the wound site twice daily and covered with a gauze pad. The gauze pad can be changed as needed for saturation      Medications: As prescribed  Educated to contact physician with concerns or signs of infection (redness, increasing pain, discharge, odor, fever).     The entirety of the procedure was performed by Dr. Zahida Dickson with first assistance by Matthew Reyes     Please note that the medical decision making for the patient as well as the subjective, objective, assessment and plan were reviewed and performed by Dr Abhi Jones MD

## 2021-12-06 NOTE — PROGRESS NOTES
Subjective: Follow up today from excision of symptomatic forehead scar. Denies fever, nausea, vomiting, leg pain or swelling, pain is absent. The pt states that they have  kept the wound site(s) covered with mupirocin ointment. The patient states that they have  finished their antibiotic. They state that their pain is absent. Objective: There were no vitals taken for this visit. Forehead Wound: Clean, dry, and intact, no drainage. No signs of infection, wound healing well  Neuro- Sensation  intact to magui incisional site with light touch . Cranial nerves II-XII grossly intact. Assessment:    Patient Active Problem List   Diagnosis    Acquired hammer toe of great toes of both feet    Hallux rigidus of both feet       Labs: CBC: No results found for: WBC, RBC, HGB, HCT, MCV, MCH, MCHC, RDW, PLT, MPV  BMP:    Lab Results   Component Value Date    BUN 14 08/19/2021    CREATININE 1.0 08/19/2021    GFRAA >60 08/19/2021    LABGLOM >60 08/19/2021         Pathology Report- pending    Plan:     Educated the pt on their pathology report. Pt voices understanding      Dressing -none   Showering at this time -ok  Discontinue mupirocin ointment in 1 week    F/U 3 weeks  Call office with concerns or signs of infection.     AXEL Helm

## 2021-12-10 ENCOUNTER — OFFICE VISIT (OUTPATIENT)
Dept: SURGERY | Age: 66
End: 2021-12-10

## 2021-12-10 VITALS — TEMPERATURE: 97.9 F

## 2021-12-10 DIAGNOSIS — L90.5 SCAR OF FACE: Primary | ICD-10-CM

## 2021-12-10 PROCEDURE — 99024 POSTOP FOLLOW-UP VISIT: CPT | Performed by: PHYSICIAN ASSISTANT

## 2022-01-05 ENCOUNTER — OFFICE VISIT (OUTPATIENT)
Dept: SURGERY | Age: 67
End: 2022-01-05

## 2022-01-05 VITALS — TEMPERATURE: 97.8 F | HEART RATE: 76 BPM | OXYGEN SATURATION: 96 %

## 2022-01-05 DIAGNOSIS — L90.5 SCAR OF FACE: Primary | ICD-10-CM

## 2022-01-05 PROCEDURE — 99024 POSTOP FOLLOW-UP VISIT: CPT | Performed by: PHYSICIAN ASSISTANT

## 2022-01-05 NOTE — PROGRESS NOTES
Subjective: Follow up today from excision of symptomatic forehead scar. Denies fever, nausea, vomiting, leg pain or swelling, pain is absent. He presents today for continued wound examination. He is inquiring on when he can have his remaining hyperpigmented scar excised as he is happy with his result to date. Objective:    Pulse 76   Temp 97.8 °F (36.6 °C) (Infrared)   SpO2 96%       Forehead Wound: Clean, dry, and intact, no drainage. No signs of infection, wound well-healed no signs of invagination or hyperpigmentation. Neuro- Sensation  intact to magui incisional site with light touch . Cranial nerves II-XII grossly intact. Assessment:    Patient Active Problem List   Diagnosis    Acquired hammer toe of great toes of both feet    Hallux rigidus of both feet       Labs: CBC: No results found for: WBC, RBC, HGB, HCT, MCV, MCH, MCHC, RDW, PLT, MPV  BMP:    Lab Results   Component Value Date    BUN 14 08/19/2021    CREATININE 1.0 08/19/2021    GFRAA >60 08/19/2021    LABGLOM >60 08/19/2021         Pathology Report- pending    Plan:     Educated the pt on their pathology report. Pt voices understanding      Dressing -none   Showering at this time -ok  Discontinue mupirocin ointment     I informed patient that his scars maturing well we will need to allow to mature before assessing his final scar maturity and the need or plan for further scar excision as she does elucidate some hyper pigmentation of his scar line to the forehead which was not excised at his last procedure. Patient voices understanding  Educated patient on scar care    Scar Care Instructions      Sunscreen Recommendations for Scars   We recommend that all patients use a sunscreen when outside but especially on new scars (that are exposed to sunlight) for a year after their procedure.  The SPF should be at least 28. Follow the application directions on the bottle. Why is it so Important to Use Sunscreen on Scars?    A scar is new and more fragile than the surrounding skin.  If you do not use sunscreen, the scar line will react differently to the sun than the surrounding skin.  If you don't use sunscreen, the scar tissue will become darker than surrounding skin. This is a hyper-pigmented scar and will remain darker than the other skin.  After one year, the scar and surrounding skin should react equally to sun. Superficial Scar Massage   Scar massage desensitizes and reduces scar adhesions so skin glides freely.  Rub in a circular motion on and around the scar with firm, even pressure for 5 minutes four times per day    You can start scar massage once incision is completely healed and strong enough to handle the motion (usually 10 - 14 days post operatively).  You use lotion to do the scar massage to allow ease with motion over the scar and prevent friction at the area. Patient had no further Questions. Paper instructions given to patient. F/U 6 months  Call office with concerns or signs of infection.     AXEL Camarena

## 2022-07-13 ENCOUNTER — OFFICE VISIT (OUTPATIENT)
Dept: SURGERY | Age: 67
End: 2022-07-13
Payer: MEDICARE

## 2022-07-13 VITALS
WEIGHT: 220 LBS | BODY MASS INDEX: 32.58 KG/M2 | HEART RATE: 68 BPM | DIASTOLIC BLOOD PRESSURE: 70 MMHG | RESPIRATION RATE: 16 BRPM | TEMPERATURE: 99 F | OXYGEN SATURATION: 99 % | SYSTOLIC BLOOD PRESSURE: 128 MMHG | HEIGHT: 69 IN

## 2022-07-13 DIAGNOSIS — L90.5 SCAR OF FACE: Primary | ICD-10-CM

## 2022-07-13 PROCEDURE — 99211 OFF/OP EST MAY X REQ PHY/QHP: CPT | Performed by: PLASTIC SURGERY

## 2022-07-13 PROCEDURE — G8417 CALC BMI ABV UP PARAM F/U: HCPCS | Performed by: PLASTIC SURGERY

## 2022-07-13 PROCEDURE — G8427 DOCREV CUR MEDS BY ELIG CLIN: HCPCS | Performed by: PLASTIC SURGERY

## 2022-07-13 RX ORDER — MELOXICAM 15 MG/1
TABLET ORAL
COMMUNITY
Start: 2019-10-10

## 2022-07-13 RX ORDER — IBUPROFEN 600 MG/1
TABLET ORAL
COMMUNITY
Start: 2022-06-07

## 2022-07-13 RX ORDER — FINASTERIDE 5 MG/1
TABLET, FILM COATED ORAL
COMMUNITY
Start: 2022-06-06

## 2022-07-13 RX ORDER — CETIRIZINE HYDROCHLORIDE 10 MG/1
TABLET ORAL
COMMUNITY
Start: 2022-06-22

## 2022-07-13 RX ORDER — FLUTICASONE PROPIONATE 50 MCG
SPRAY, SUSPENSION (ML) NASAL
COMMUNITY
Start: 2022-06-22

## 2022-07-13 NOTE — PROGRESS NOTES
Subjective: Follow up today from excision of symptomatic forehead scar. Denies fever, nausea, vomiting, leg pain or swelling, pain is absent. He presents today for continued wound examination. He states he has seen improvement over his previous treatment and still does note some hyperpigmentation to the scarring centrally with surrounding hypopigmentation. He has noticed the surrounding contour much improved. He presents today with his wife    Objective:    /70 (Site: Left Upper Arm, Position: Sitting, Cuff Size: Large Adult)   Pulse 68   Temp 99 °F (37.2 °C) (Temporal)   Resp 16   Ht 5' 9\" (1.753 m)   Wt 220 lb (99.8 kg)   SpO2 99%   BMI 32.49 kg/m²       Forehead Wound: Prior excision scar line clean, dry, and intact, no drainage. No signs of infection, wound well-healed no signs of invagination there is noted hypopigmentation to the lateral edges of the scar line with some centralized hyperpigmentation. The remaining forehead contour from the scar to the native dermis is much improved. Neuro- Sensation  intact to magui incisional site with light touch . Cranial nerves II-XII grossly intact. Assessment:    Patient Active Problem List   Diagnosis    Acquired hammer toe of great toes of both feet    Hallux rigidus of both feet       Labs: CBC: No results found for: WBC, RBC, HGB, HCT, MCV, MCH, MCHC, RDW, PLT, MPV  BMP:    Lab Results   Component Value Date/Time    BUN 14 08/19/2021 03:39 PM    CREATININE 1.0 08/19/2021 03:39 PM    GFRAA >60 08/19/2021 03:39 PM    LABGLOM >60 08/19/2021 03:39 PM             Plan:     Traumatic scar forehead. This patient has stated he is noticing improvement of the scarring and is unsure if he wants to have more procedures I will have him continue with scar maturity at this time.   I informed the patient as he has had his forehead scar excised previously with the hyperpigmentation returning to this area it is not advisable to plan for reexcision at this time. Patient states that the remaining aspect of his forehead scar has improved second to the laser procedures that he has had the past.  He and his wife voiced appreciation for the care they had to date photodocumentation was taken today he will contact her office in the future if he would like to discuss any further scar maturity or procedures.     Jevon Alejandre MD  [facility]-PRN

## 2022-10-10 ENCOUNTER — HOSPITAL ENCOUNTER (EMERGENCY)
Age: 67
Discharge: HOME | End: 2022-10-10
Payer: MEDICARE

## 2022-10-10 ENCOUNTER — APPOINTMENT (OUTPATIENT)
Dept: GENERAL RADIOLOGY | Age: 67
End: 2022-10-10
Payer: MEDICARE

## 2022-10-10 VITALS
DIASTOLIC BLOOD PRESSURE: 101 MMHG | OXYGEN SATURATION: 98 % | BODY MASS INDEX: 33.23 KG/M2 | WEIGHT: 225 LBS | HEART RATE: 82 BPM | RESPIRATION RATE: 20 BRPM | SYSTOLIC BLOOD PRESSURE: 166 MMHG | TEMPERATURE: 98.1 F

## 2022-10-10 DIAGNOSIS — J40 BRONCHITIS: Primary | ICD-10-CM

## 2022-10-10 PROCEDURE — 71046 X-RAY EXAM CHEST 2 VIEWS: CPT

## 2022-10-10 PROCEDURE — 99211 OFF/OP EST MAY X REQ PHY/QHP: CPT

## 2022-10-10 RX ORDER — DOXYCYCLINE HYCLATE 100 MG
100 TABLET ORAL 2 TIMES DAILY
Qty: 20 TABLET | Refills: 0 | Status: SHIPPED | OUTPATIENT
Start: 2022-10-10 | End: 2022-10-20

## 2022-10-10 RX ORDER — BROMPHENIRAMINE MALEATE, PSEUDOEPHEDRINE HYDROCHLORIDE, AND DEXTROMETHORPHAN HYDROBROMIDE 2; 30; 10 MG/5ML; MG/5ML; MG/5ML
5 SYRUP ORAL 4 TIMES DAILY PRN
Qty: 140 ML | Refills: 0 | Status: SHIPPED | OUTPATIENT
Start: 2022-10-10 | End: 2022-10-17

## 2022-10-10 ASSESSMENT — PAIN DESCRIPTION - DESCRIPTORS: DESCRIPTORS: SORE

## 2022-10-10 ASSESSMENT — PAIN SCALES - GENERAL: PAINLEVEL_OUTOF10: 7

## 2022-10-10 ASSESSMENT — PAIN - FUNCTIONAL ASSESSMENT: PAIN_FUNCTIONAL_ASSESSMENT: 0-10

## 2022-10-10 ASSESSMENT — PAIN DESCRIPTION - LOCATION: LOCATION: THROAT
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
## 2022-10-19 NOTE — PROGRESS NOTES
[FreeTextEntry1] : 13 year old male presenting with localized lower back pain. He recently had a X-ray done which showed lumbar scoliosis. To further confirm any underlying pathology, I will obtain a MRI of the lumbar spine. He may have a underlying disc pathology. I will have him begin aggressive physical therapy and I will have him receive a Lumbar LSO brace. He was advised not to participate in any gym classes until he sees a physical therapist. Follow up in 6 weeks will be made for reassessment. I have explained the findings to the patient and all questions have been answered.\par \par MRI of the lumbar spine was ordered to delineate spine pathology such as disc displacement and stenosis.\par \par Physical therapy of the lumbar spine 2-3 times a week for 4-8 weeks stressing a home exercise program of walking, shoulder griddle strengthening,  swimming, elliptical , recumbent bike, [place] chi and Yoga. Use things that heat like hot shower or icy heat before rehab, exercising and at the beginning of the day, and ice (ice in a bag never directly on the skin) after activity and at the end of the day.\par \par Patient has pain in spinal movement along with weakness in extension and flexion. I will put in for a lumbar brace with lateral supports to be worn no more than 4 hours a day and 2 hours in a row to decrease facet and disc load, to decrease pain, increase activity, increase function, to reduce pain by restricting mobility of the trunk, to facilitate healing of the spine and related Soft tissues and to support weak spinal muscles used to help the patient with rehab and home exercise program stressing walking.  Other exercises discussed include swimming, elliptical , recumbent bike, [place] chi and Yoga. Use things that heat like hot shower or icy heat before rehab and exercising and at the beginning of the day, and ice (ice in a bag never directly on the skin) after activity and at the end of the day.\par \par \par Entered by Donna Lujan, acting as scribe for Dr. Mchugh.\par  \par The documentation recorded by the scribe, in my presence, accurately reflects the service I personally performed, and the decisions made by me with my edits as appropriate.\par  \par Best Regards, \par Daniel Mchugh MD \par Board Certified, Anesthesiology \par Board Certified, Pain Medicine\par \par  directed.  Post treatment discomfort may be relieved by oral pain relievers; i.e. Extra Strength Tylenol.  A cold compress or an ice pack can be used to provide comfort if the treated area is especially warm. This is typically only within the first 12 hours after the treatment.  Cleanse the skin two times a day with plain, lukewarm water and a gentle cleanser; (i.e. Cetaphil) beginning the morning after the treatment. Use your hands to gently apply the cleanser and water and finish by patting dry with a soft cloth. Be careful not to rub the treated area.  After cleansing your face, reapply the occlusive barrier, (i.e. Aquaphor) taking care to cover all treated areas. The occlusive barrier is needed to provide a protective barrier that will hold moisture in the skin and provide protection to the skin from pollutants in the air as the skin heals. As a rule of thumb, the occlusive barrier is needed 1 day per 10 microns of skin or once skin has re-epithielized. For example: a 20 micron MLP = 2 days of wearing the occlusive barrier. Reapply the occlusive barrier as needed. Do not allow the treated area to dry out.  Peeling and flaking generally occur within 24 hours post treatment and should be allowed to come off naturally. DO NOT PICK, RUB, OR FORCE OFF ANY SKIN DURING THE HEALING PROCESS. THIS COULD RESULT IN SCARRING AND INFECTION! Gently washing the skin more frequently will help to promote the peeling process.  Avoid direct sunlight for up to 2 months post treatment.  Once skin has healed ( no longer wearing the occlusive barrier) you may begin to wear makeup. If you are able to wear makeup, a sunblock should be worn on a daily basis to help prevent and hyperpigmentation issues that could be caused by direct and indirect sunlight.  When showering, be sure to wash your hair behind you to avoid getting shampoo directly on the treated area.    Avoid strenuous exercise and sweating until

## 2024-09-18 ENCOUNTER — HOSPITAL ENCOUNTER (EMERGENCY)
Age: 69
Discharge: HOME | End: 2024-09-18
Payer: MEDICARE

## 2024-09-18 VITALS
RESPIRATION RATE: 14 BRPM | HEART RATE: 66 BPM | WEIGHT: 225 LBS | BODY MASS INDEX: 33.23 KG/M2 | DIASTOLIC BLOOD PRESSURE: 88 MMHG | TEMPERATURE: 98.4 F | SYSTOLIC BLOOD PRESSURE: 170 MMHG | OXYGEN SATURATION: 97 %

## 2024-09-18 DIAGNOSIS — R30.0 DYSURIA: Primary | ICD-10-CM

## 2024-09-18 LAB
BILIRUB UR QL STRIP: NEGATIVE
CLARITY UR: CLEAR
COLOR UR: YELLOW
GLUCOSE UR STRIP-MCNC: NEGATIVE MG/DL
HGB UR QL STRIP.AUTO: NEGATIVE
KETONES UR STRIP-MCNC: NEGATIVE MG/DL
LEUKOCYTE ESTERASE UR QL STRIP: NEGATIVE
NITRITE UR QL STRIP: NEGATIVE
PH UR STRIP: 5.5 [PH] (ref 5–9)
PROT UR STRIP-MCNC: NEGATIVE MG/DL
RBC #/AREA URNS HPF: NORMAL /HPF
SP GR UR STRIP: 1.01 (ref 1–1.03)
UROBILINOGEN UR STRIP-ACNC: 0.2 EU/DL (ref 0–1)
WBC #/AREA URNS HPF: NORMAL /HPF

## 2024-09-18 PROCEDURE — 99283 EMERGENCY DEPT VISIT LOW MDM: CPT

## 2024-09-18 PROCEDURE — 81001 URINALYSIS AUTO W/SCOPE: CPT

## 2024-09-18 ASSESSMENT — PAIN SCALES - GENERAL: PAINLEVEL_OUTOF10: 10

## 2024-09-18 ASSESSMENT — PAIN DESCRIPTION - ONSET: ONSET: ON-GOING

## 2024-09-18 ASSESSMENT — PAIN - FUNCTIONAL ASSESSMENT: PAIN_FUNCTIONAL_ASSESSMENT: 0-10

## 2024-09-18 ASSESSMENT — LIFESTYLE VARIABLES
HOW MANY STANDARD DRINKS CONTAINING ALCOHOL DO YOU HAVE ON A TYPICAL DAY: PATIENT DOES NOT DRINK
HOW OFTEN DO YOU HAVE A DRINK CONTAINING ALCOHOL: NEVER

## 2024-09-18 ASSESSMENT — PAIN DESCRIPTION - PAIN TYPE: TYPE: ACUTE PAIN

## 2024-09-18 ASSESSMENT — PAIN DESCRIPTION - LOCATION: LOCATION: PENIS

## 2024-09-18 ASSESSMENT — PAIN DESCRIPTION - FREQUENCY: FREQUENCY: CONTINUOUS

## 2024-09-18 ASSESSMENT — PAIN DESCRIPTION - DESCRIPTORS: DESCRIPTORS: BURNING;SORE;DISCOMFORT

## (undated) DEVICE — NEEDLE HYPO 25GA L1.5IN BLU POLYPR HUB S STL REG BVL STR

## (undated) DEVICE — TOWEL OR BLUEE 16X26IN ST 8 PACK ORB08 16X26ORTWL

## (undated) DEVICE — SINGLE TROCAR WIRE
Type: IMPLANTABLE DEVICE | Site: FOOT | Status: NON-FUNCTIONAL
Brand: CHARLOTTE
Removed: 2019-03-15

## (undated) DEVICE — Z CONVERTED USE 2275207 CLOTH PREP W7.5XL7.5IN 2% CHG SKIN ALC AND RNS FREE

## (undated) DEVICE — PLATE TACK
Type: IMPLANTABLE DEVICE | Status: NON-FUNCTIONAL
Removed: 2019-03-15

## (undated) DEVICE — GAUZE,SPONGE,4"X4",16PLY,STRL,LF,10/TRAY: Brand: MEDLINE

## (undated) DEVICE — INTENDED FOR TISSUE SEPARATION, AND OTHER PROCEDURES THAT REQUIRE A SHARP SURGICAL BLADE TO PUNCTURE OR CUT.: Brand: BARD-PARKER ® STAINLESS STEEL BLADES

## (undated) DEVICE — BANDAGE,GAUZE,BULKEE II,4.5"X4.1YD,STRL: Brand: MEDLINE

## (undated) DEVICE — DRESSING GZ XRFRM 4X4(25/BX 6BX/CS)

## (undated) DEVICE — ENCORE® LATEX TEXTURED SIZE 7.5, STERILE LATEX POWDER-FREE SURGICAL GLOVE: Brand: ENCORE

## (undated) DEVICE — SYRINGE BLB 50CC IRRIG PLIABLE FNGR FLNG GRAD FLSK DISP

## (undated) DEVICE — PEN: MARKING STD 100/CS: Brand: MEDICAL ACTION INDUSTRIES

## (undated) DEVICE — BASIC PACK: Brand: CONVERTORS

## (undated) DEVICE — PRECISION THIN (9.0 X 0.38 X 25.0MM)

## (undated) DEVICE — MEDI-VAC NON-CONDUCTIVE SUCTION TUBING: Brand: CARDINAL HEALTH

## (undated) DEVICE — SUTURE ETHLN SZ 4-0 L18IN NONABSORBABLE BLK L19MM PS-2 3/8 1667H

## (undated) DEVICE — PADDING CAST W4INXL4YD SPUN DACRON POLY POR SYN VERSATILE

## (undated) DEVICE — DRILL BIT

## (undated) DEVICE — SUTURE MCRYL SZ 3-0 L27IN ABSRB UD L26MM SH 1/2 CIR Y416H

## (undated) DEVICE — SUTURE VCRL SZ 2-0 L27IN ABSRB UD L26MM CT-2 1/2 CIR J269H

## (undated) DEVICE — SOLUTION IV IRRIG POUR BRL 0.9% SODIUM CHL 2F7124

## (undated) DEVICE — CHLORAPREP 26ML ORANGE

## (undated) DEVICE — PADDING CAST W6INXL4YD NONSTERILE WHT SYN FBR NONABSORBENT

## (undated) DEVICE — NEEDLE HYPO 18GA L1.5IN PNK POLYPR HUB S STL THN WALL FILL

## (undated) DEVICE — DRAPE 84X54IN RADIOLOGY C ARM KEYBOARD

## (undated) DEVICE — SMALL TEAR CROSS CUT RASP (11.0 X 5.0MM)

## (undated) DEVICE — TIBURON EXTREMITY SHEET: Brand: CONVERTORS

## (undated) DEVICE — HANDLE CVR PATENTED RETENTION DISC STRL LIGHT SHLD

## (undated) DEVICE — 1810 FOAM BLOCK NEEDLE COUNTER: Brand: DEVON

## (undated) DEVICE — GLOVE SURG 7.5 LTX TRIFLEX WIDE FINGER PWDR

## (undated) DEVICE — 12 ML SYRINGE,LUER-LOCK TIP: Brand: MONOJECT

## (undated) DEVICE — GAUZE,SPONGE,4"X4",16PLY,XRAY,STRL,LF: Brand: MEDLINE